# Patient Record
Sex: FEMALE | Race: WHITE
[De-identification: names, ages, dates, MRNs, and addresses within clinical notes are randomized per-mention and may not be internally consistent; named-entity substitution may affect disease eponyms.]

---

## 2018-05-02 ENCOUNTER — HOSPITAL ENCOUNTER (EMERGENCY)
Dept: HOSPITAL 97 - ER | Age: 58
Discharge: HOME | End: 2018-05-02
Payer: COMMERCIAL

## 2018-05-02 DIAGNOSIS — S52.502A: ICD-10-CM
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
## 2018-05-02 DIAGNOSIS — S82.025A: Primary | ICD-10-CM
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
## 2018-05-02 PROCEDURE — 99284 EMERGENCY DEPT VISIT MOD MDM: CPT

## 2018-05-02 PROCEDURE — 96372 THER/PROPH/DIAG INJ SC/IM: CPT

## 2018-05-02 NOTE — RAD REPORT
EXAM DESCRIPTION:  RAD - Knee Left 3 View - 5/2/2018 6:20 pm

 

CLINICAL HISTORY:   Left knee pain status post injury

 

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

 

A lipohemarthrosis is present indicative of a fracture.

 

Cortical irregularity involves patella which is suspicious for a fracture.

 

No dislocation is seen

## 2018-05-02 NOTE — ER
Nurse's Notes                                                                                     

 [facility]                                                                

Name: Mary Mcdonough                                                                                

Age: 57 yrs                                                                                       

Sex: Female                                                                                       

: 1960                                                                                   

MRN: [id number]                                                                                   

Arrival Date: 2018                                                                          

Time: 17:40                                                                                       

Account#: [id number]                                                                            

Bed 19                                                                                            

Private MD: out of town, doctor                                                                   

Diagnosis: Fall on same level from slipping, tripping and stumbling;Nondisplaced longitudinal     

  fracture of left patella;NONDISPLACED FRACTURE OF DISTAL RADIUS                                 

                                                                                                  

Presentation:                                                                                     

                                                                                             

17:42 Presenting complaint: Patient states: Reports trip and fall onto left knee 1 hour PTA,  aj  

      attempted to catch herself with left wrist. No LOC. Care prior to arrival: None.            

      Mechanism of Injury: Fall from standing position. Trauma event details: Injury occurred     

      in the [facility], Injury occurred: at home. Injury occurred: May 02, 2018          

      Injury occurred at: 14:53.                                                                  

17:42 Acuity: NATACHA 4                                                                             

17:42 Method Of Arrival: Wheelchair                                                             

17:45 Transition of care: patient was not received from another setting of care. Onset of     aj  

      symptoms was May 02, 2018. Initial Sepsis Screen: Does the patient meet any 2 criteria?     

      No. Patient's initial sepsis screen is negative. Does the patient have a suspected          

      source of infection? No. Patient's initial sepsis screen is negative.                       

                                                                                                  

Trauma Activation: Not Applicable                                                                 

 Physician: ED Physician; Name: ; Notified At: ; Arrived At:                                      

 Physician: General Surgeon; Name: ; Notified At: ; Arrived At:                                   

 Physician: Radiology; Name: ; Notified At: ; Arrived At:                                         

 Physician: Respiratory; Name: ; Notified At: ; Arrived At:                                       

 Physician: Lab; Name: ; Notified At: ; Arrived At:                                               

                                                                                                  

Historical:                                                                                       

- Allergies:                                                                                      

17:46 SHELLFISH;                                                                              aj  

- Home Meds:                                                                                      

17:46 Ibuprofen Oral [Active];                                                                aj  

- PMHx:                                                                                           

17:46 "Giant cell tumor";                                                                     aj  

- PSHx:                                                                                           

17:46 Tumor removal from left femur;                                                          aj  

                                                                                                  

- Immunization history: Last tetanus immunization: - up to date.                                  

- Social history:: Smoking status: Patient/guardian denies using tobacco.                         

                                                                                                  

                                                                                                  

Screenin:55 Abuse screen: Denies threats or abuse. Nutritional screening: No deficits noted.        em  

      Tuberculosis screening: No symptoms or risk factors identified. Fall Risk None              

      identified.                                                                                 

                                                                                                  

Primary Survey:                                                                                   

17:42 A: Airway: patent. Breathing/Chest: Respiratory pattern: regular, Respiratory effort:   aj  

      spontaneous, unlabored. Circulation: Skin color: pink, Skin temperature: warm, dry.         

      Disability Alert.                                                                           

                                                                                                  

Assessment:                                                                                       

17:42 General: Appears in no apparent distress. comfortable, Behavior is calm, cooperative,   aj  

      appropriate for age. Pain: Complains of pain in left wrist and left knee Pain currently     

      is 9 out of 10 on a pain scale. Neuro: Level of Consciousness is awake, alert, obeys        

      commands, Oriented to person, place, time, situation. Respiratory: Airway is patent         

      Respiratory effort is even, unlabored, Respiratory pattern is regular, symmetrical.         

      Derm: Skin is intact, is healthy with good turgor, Skin is pink, warm \T\ dry. normal.      

      Musculoskeletal: Reports pain in left wrist and left knee.                                  

19:12 Reassessment: Patient appears in no apparent distress at this time.                     iw  

19:48 Reassessment: Patient appears in no apparent distress at this time. Patient and/or      jd3 

      family updated on plan of care and expected duration. Pain level reassessed. Patient is     

      alert, oriented x 3, equal unlabored respirations, skin warm/dry/pink. pt reported          

      understanding of discharge instructions, assisted pt to front of ER with wheelchair.        

                                                                                                  

Vital Signs:                                                                                      

17:42  / 85; Pulse 92; Resp 20; Temp 98.8; Pulse Ox 98% on R/A; Weight 74.84 kg; Height aj  

      5 ft. 3 in. (160.02 cm); Pain 9/10;                                                         

19:39  / 68; Pulse 62; Resp 17 S; Pulse Ox 96% on R/A;                                  jd3 

17:42 Body Mass Index 29.23 (74.84 kg, 160.02 cm)                                             aj  

                                                                                                  

Freedom Coma Score:                                                                               

17:42 Eye Response: spontaneous(4). Verbal Response: oriented(5). Motor Response: obeys         

      commands(6). Total: 15.                                                                     

                                                                                                  

Trauma Score (Adult):                                                                             

17:42 Eye Response: spontaneous(1); Verbal Response: oriented(1); Motor Response: obeys         

      commands(2); Systolic BP: > 89 mm Hg(4); Respiratory Rate: 10 to 29 per min(4); Ryan     

      Score: 15; Trauma Score: 12                                                                 

                                                                                                  

ED Course:                                                                                        

17:40 Patient arrived in ED.                                                                  mr  

17:40 out of town, doctor is Private Physician.                                               mr  

17:43 Triage completed.                                                                       aj  

17:46 Arm band placed on right wrist. Patient placed in an exam room.                         aj  

17:49 [facility]                                                        snw 

17:49 Donald Hicks MD is Attending Physician.                                             snw 

17:57 Mohsen Eng LVN is Primary Nurse.                                                     em  

18:18 X-ray completed. Portable x-ray completed in exam room. Patient tolerated procedure     bb2 

      well.                                                                                       

18:19 Knee Left 3 View XRAY In Process Unspecified.                                           EDMS

18:19 Wrist Left (3 View) XRAY In Process Unspecified.                                        EDMS

19:22 Patient has correct armband on for positive identification. Bed in low position. Call   em  

      light in reach. Adult w/ patient.                                                           

19:22 No provider procedures requiring assistance completed. Patient did not have IV access   em  

      during this emergency room visit.                                                           

19:36 Orthoglass splint: Thumb spica splint applied on left forearm. Knee immobilizer applied cb2 

      on left knee. Sling applied to left arm.                                                    

19:45 Primary Nurse role handed off by Mohsen Eng LVN                                      rg2 

19:48 Vince Zaman RN is Primary Nurse.                                                  jd3 

                                                                                                  

Administered Medications:                                                                         

18:42 Not Given (Physician Discretion): Valium 2 mg PO once                                   em  

18:43 Drug: Valium 2.5 mg Route: PO;                                                          em  

18:58 Follow up: Response: No adverse reaction                                                em  

19:06 Drug: morphine 8 mg Route: IM; Site: right deltoid;                                     jd3 

19:50 Follow up: Response: No adverse reaction; Pain is decreased                             jd3 

                                                                                                  

                                                                                                  

Outcome:                                                                                          

19:06 Discharge ordered by MD.                                                                snw 

19:48 Discharged to home via wheelchair, with family.                                         jd3 

19:48 Condition: stable                                                                           

19:48 Discharge instructions given to patient, Instructed on discharge instructions, follow       

      up and referral plans. medication usage, Demonstrated understanding of instructions,        

      follow-up care, medications, Prescriptions given X 1.                                       

19:51 Patient left the ED.                                                                    jd3 

                                                                                                  

Signatures:                                                                                       

Dispatcher [employer]                           Brian Dover                               rg2                                                  

Arabella Bruno RN RN aj Therrien, Shelly, FNP-C                 FNP-Csnw                                                  

AlbertsBekah                                mr                                                   

Mohsen Eng LVN LVN  em                                                   

Angelina Looney RN RN                                                      

Franc Wooten                             [facility]                                                  

Vince Zaman RN RN jd3                                                  

Aleena Rogers                               bb2                                                  

                                                                                                  

Corrections: (The following items were deleted from the chart)                                    

17:44 17:42 Immunization history Last tetanus immunization: - up to date. lana schwartz  

                                                                                                  

**************************************************************************************************

## 2018-05-02 NOTE — EDPHYS
Physician Documentation                                                                           

 [facility]                                                                

Name: Mary Mcdonough                                                                                

Age: 57 yrs                                                                                       

Sex: Female                                                                                       

: 1960                                                                                   

MRN: [id number]                                                                                   

Arrival Date: 2018                                                                          

Time: 17:40                                                                                       

Account#: [id number]                                                                            

Bed 19                                                                                            

Private MD: out of town, doctor                                                                   

ED Physician Donald Hicks                                                                      

HPI:                                                                                              

                                                                                             

18:04 This 57 yrs old  Female presents to ER via Wheelchair with complaints of Fall  snw 

      Injury.                                                                                     

18:04 Details of fall: The patient fell from an upright position, while walking. Onset: The   snw 

      symptoms/episode began/occurred suddenly, just prior to arrival. Associated injuries:       

      The patient sustained left knee, left wrist, abrasion, contusion, decreased range of        

      motion, painful injury, swelling. Severity of symptoms: At their worst the symptoms         

      were moderate. It is unknown whether or not the patient has had similar symptoms in the     

      past. from [place]. 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     

      pieces remain. right hand dominant.                                                         

                                                                                                  

Historical:                                                                                       

- Allergies:                                                                                      

17:46 SHELLFISH;                                                                              aj  

- Home Meds:                                                                                      

17:46 Ibuprofen Oral [Active];                                                                aj  

- PMHx:                                                                                           

17:46 "Giant cell tumor";                                                                     aj  

- PSHx:                                                                                           

17:46 Tumor removal from left femur;                                                          aj  

                                                                                                  

- Immunization history: Last tetanus immunization: - up to date.                                  

- Social history:: Smoking status: Patient/guardian denies using tobacco.                         

                                                                                                  

                                                                                                  

ROS:                                                                                              

18:02 Eyes: Negative for injury, pain, redness, and discharge, ENT: Negative for injury,      snw 

      pain, and discharge, Neck: Negative for injury, pain, and swelling, Cardiovascular:         

      Negative for chest pain, palpitations, and edema, Respiratory: Negative for shortness       

      of breath, cough, wheezing, and pleuritic chest pain, Abdomen/GI: Negative for              

      abdominal pain, nausea, vomiting, diarrhea, and constipation, Back: Negative for injury     

      and pain, : Negative for injury, bleeding, discharge, and swelling, Neuro: Negative       

      for headache, weakness, numbness, tingling, and seizure.                                    

18:02 Constitutional: Positive for body aches, malaise.                                           

18:02 MS/extremity: Positive for injury or acute deformity, swelling, tenderness, of the left     

      knee and left wrist.                                                                        

                                                                                                  

Exam:                                                                                             

18:02 Constitutional:  This is a well developed, well nourished patient who is awake, alert,  snw 

      and in mild cute distress. Head/Face:  Normocephalic, atraumatic. Eyes:  Pupils equal       

      round and reactive to light, extra-ocular motions intact.  Lids and lashes normal.          

      Conjunctiva and sclera are non-icteric and not injected.  Cornea within normal limits.      

      Periorbital areas with no swelling, redness, or edema. ENT:  Nares patent. No nasal         

      discharge, no septal abnormalities noted.  Tympanic membranes are normal and external       

      auditory canals are clear.  Oropharynx with no redness, swelling, or masses, exudates,      

      or evidence of obstruction, uvula midline.  Mucous membranes moist. Neck:  Trachea          

      midline, no thyromegaly or masses palpated, and no cervical lymphadenopathy.  Supple,       

      full range of motion without nuchal rigidity, or vertebral point tenderness.  No            

      Meningismus. Chest/axilla:  Normal chest wall appearance and motion.  Nontender with no     

      deformity.  No lesions are appreciated. Cardiovascular:  Regular rate and rhythm with a     

      normal S1 and S2.  No gallops, murmurs, or rubs.  Normal PMI, no JVD.  No pulse             

      deficits. Respiratory:  Lungs have equal breath sounds bilaterally, clear to                

      auscultation and percussion.  No rales, rhonchi or wheezes noted.  No increased work of     

      breathing, no retractions or nasal flaring. Abdomen/GI:  Soft, non-tender, with normal      

      bowel sounds.  No distension or tympany.  No guarding or rebound.  No evidence of           

      tenderness throughout. Back:  No spinal tenderness.  No costovertebral tenderness.          

      Full range of motion. Neuro:  Awake and alert, GCS 15, oriented to person, place, time,     

      and situation.  Cranial nerves II-XII grossly intact.  Motor strength 5/5 in all            

      extremities.  Sensory grossly intact.  Cerebellar exam normal.  Normal gait. Psych:         

      Awake, alert, with orientation to person, place and time.  Behavior, mood, and affect       

      are within normal limits.                                                                   

18:02 Skin: injury, abrasion(s), small abrasion noted, of the left ankle, left knee,              

      contusion(s), that are deep, of the left wrist - probable fx of metacarpals, edema,         

      tenderness, mild deformity.                                                                 

                                                                                                  

Vital Signs:                                                                                      

17:42  / 85; Pulse 92; Resp 20; Temp 98.8; Pulse Ox 98% on R/A; Weight 74.84 kg; Height aj  

      5 ft. 3 in. (160.02 cm); Pain 9/10;                                                         

19:39  / 68; Pulse 62; Resp 17 S; Pulse Ox 96% on R/A;                                  jd3 

17:42 Body Mass Index 29.23 (74.84 kg, 160.02 cm)                                             aj  

                                                                                                  

Ryan Coma Score:                                                                               

17:42 Eye Response: spontaneous(4). Verbal Response: oriented(5). Motor Response: obeys       aj  

      commands(6). Total: 15.                                                                     

                                                                                                  

Trauma Score (Adult):                                                                             

17:42 Eye Response: spontaneous(1); Verbal Response: oriented(1); Motor Response: obeys       aj  

      commands(2); Systolic BP: > 89 mm Hg(4); Respiratory Rate: 10 to 29 per min(4); Ryan     

      Score: 15; Trauma Score: 12                                                                 

                                                                                                  

MDM:                                                                                              

17:50 Patient medically screened.                                                             snw 

19:29 Data reviewed: vital signs, nurses notes. Data interpreted: Pulse oximetry: on room air snw 

      is 98 %. Interpretation: normal. Counseling: I had a detailed discussion with the           

      patient and/or guardian regarding: the historical points, exam findings, and any            

      diagnostic results supporting the discharge/admit diagnosis, the presence of at least       

      one elevated blood pressure reading (>120/80) during this emergency department visit,       

      radiology results, the need for outpatient follow up, to return to the emergency            

      department if symptoms worsen or persist or if there are any questions or concerns that     

      arise at home. Special discussion: Based on the history and exam findings, there is no      

      indication for further emergent testing or inpatient evaluation. I discussed with the       

      patient/guardian the need to see the orthopedic surgeon for further evaluation of the       

      symptoms. I discussed with the patient/guardian the need to see the primary care            

      provider for further evaluation of the symptoms.                                            

                                                                                                  

                                                                                             

18:00 Order name: Knee Left 3 View XRAY; Complete Time: 18:44                                 snw 

                                                                                             

18:00 Order name: Wrist Left (3 View) XRAY; Complete Time: 19:27                              snw 

                                                                                             

18:00 Order name: Ice pack: x 2; Complete Time: 18:42                                         snw 

                                                                                             

18:00 Order name: Knee Immobilizer; Complete Time: 18:42                                      snw 

                                                                                             

18:00 Order name: Sugar Tong Forearm Splint; Complete Time: 18:42                             snw 

                                                                                             

18:00 Order name: Sling; Complete Time: 19:36                                                 snw 

                                                                                                  

Administered Medications:                                                                         

18:42 Not Given (Physician Discretion): Valium 2 mg PO once                                   em  

18:43 Drug: Valium 2.5 mg Route: PO;                                                          em  

18:58 Follow up: Response: No adverse reaction                                                em  

19:06 Drug: morphine 8 mg Route: IM; Site: right deltoid;                                     jd3 

19:50 Follow up: Response: No adverse reaction; Pain is decreased                             jd3 

                                                                                                  

                                                                                                  

Disposition:                                                                                      

18 19:06 Discharged to Home. Impression: Fall on same level from slipping, tripping and     

  stumbling, Nondisplaced longitudinal fracture of left patella, NONDISPLACED                     

  FRACTURE OF DISTAL RADIUS.                                                                      

- Condition is Stable.                                                                            

- Discharge Instructions: Cast or Splint Care, Fall Prevention and Home Safety, Knee              

  Immobilizer, Patellar Fracture, Adult, Radial Fracture.                                         

- Prescriptions for Tylenol- Codeine #3 300-30 mg Oral Tablet - take 2 tablet by ORAL             

  route every 6 hours As needed; 30 tablet.                                                       

- Medication Reconciliation Form, Thank You Letter, Antibiotic Education, Prescription            

  Opioid Use form.                                                                                

- Follow up: Private Physician; When: 2 - 3 days; Reason: Recheck today's complaints,             

  Continuance of care, Re-evaluation by your physician. Follow up: Emergency                      

  Department; When: As needed; Reason: Worsening of condition.                                    

                                                                                                  

                                                                                                  

                                                                                                  

Addendum:                                                                                         

2018                                                                                        

     07:04 Co-signature as Attending Physician, Donald Hicks MD I agree with the assessment and  w
a

           plan of care.                                                                          

                                                                                                  

Signatures:                                                                                       

Dispatcher [employer]                           Arabella Lu RN                       Taniya Jay, FNP-C                 FNP-Csnw                                                  

Mohsen Eng, LVN                       LVN                                                     

Donald Hicks MD MD wa Davies, Jonathon RN                    RN   jd3                                                  

                                                                                                  

Corrections: (The following items were deleted from the chart)                                    

                                                                                             

17:44 17:42 Immunization history Last tetanus immunization: - up to date. lana schwartz  

19:51 19:06 2018 19:06 Discharged to Home. Impression: Fall on same level from          jd3 

      slipping, tripping and stumbling; Nondisplaced longitudinal fracture of left patella;       

      NONDISPLACED FRACTURE OF DISTAL RADIUS. Condition is Stable. Forms are Medication           

      Reconciliation Form, Thank You Letter, Antibiotic Education, Prescription Opioid Use.       

      Follow up: Private Physician; When: 2 - 3 days; Reason: Recheck today's complaints,         

      Continuance of care, Re-evaluation by your physician. Follow up: Emergency Department;      

      When: As needed; Reason: Worsening of condition. snw                                        

                                                                                                  

**************************************************************************************************

## 2018-05-02 NOTE — RAD REPORT
EXAM DESCRIPTION:  RAD - Wrist Left 3 View - 5/2/2018 6:20 pm

 

CLINICAL HISTORY:   Left wrist pain status post injury

 

FINDINGS:  No  fracture or dislocation is seen.

 

If the patient continues to have symptoms to suggest an occult fracture then a followup plain film se
max in 7 days would be recommended

## 2023-08-04 ENCOUNTER — APPOINTMENT (OUTPATIENT)
Dept: URBAN - METROPOLITAN AREA SURGERY 9 | Age: 63
Setting detail: DERMATOLOGY
End: 2023-08-04

## 2023-08-04 DIAGNOSIS — L82.1 OTHER SEBORRHEIC KERATOSIS: ICD-10-CM

## 2023-08-04 DIAGNOSIS — B07.8 OTHER VIRAL WARTS: ICD-10-CM

## 2023-08-04 DIAGNOSIS — D22 MELANOCYTIC NEVI: ICD-10-CM

## 2023-08-04 DIAGNOSIS — L81.4 OTHER MELANIN HYPERPIGMENTATION: ICD-10-CM

## 2023-08-04 DIAGNOSIS — L57.0 ACTINIC KERATOSIS: ICD-10-CM

## 2023-08-04 PROBLEM — D22.5 MELANOCYTIC NEVI OF TRUNK: Status: ACTIVE | Noted: 2023-08-04

## 2023-08-04 PROCEDURE — OTHER SUNSCREEN RECOMMENDATIONS: OTHER

## 2023-08-04 PROCEDURE — 99203 OFFICE O/P NEW LOW 30 MIN: CPT | Mod: 25

## 2023-08-04 PROCEDURE — 17000 DESTRUCT PREMALG LESION: CPT | Mod: 59

## 2023-08-04 PROCEDURE — OTHER REASSURANCE: OTHER

## 2023-08-04 PROCEDURE — OTHER MIPS QUALITY: OTHER

## 2023-08-04 PROCEDURE — OTHER TREATMENT REGIMEN: OTHER

## 2023-08-04 PROCEDURE — 17110 DESTRUCT B9 LESION 1-14: CPT

## 2023-08-04 PROCEDURE — OTHER COUNSELING: OTHER

## 2023-08-04 PROCEDURE — OTHER LIQUID NITROGEN: OTHER

## 2023-08-04 ASSESSMENT — LOCATION DETAILED DESCRIPTION DERM
LOCATION DETAILED: EPIGASTRIC SKIN
LOCATION DETAILED: SUPERIOR LUMBAR SPINE
LOCATION DETAILED: LEFT DORSAL INDEX METACARPOPHALANGEAL JOINT
LOCATION DETAILED: LEFT FOREHEAD
LOCATION DETAILED: LEFT PROXIMAL DORSAL FOREARM
LOCATION DETAILED: RIGHT MEDIAL UPPER BACK
LOCATION DETAILED: RIGHT PROXIMAL DORSAL FOREARM

## 2023-08-04 ASSESSMENT — LOCATION SIMPLE DESCRIPTION DERM
LOCATION SIMPLE: LEFT FOREHEAD
LOCATION SIMPLE: LEFT FOREARM
LOCATION SIMPLE: LEFT HAND
LOCATION SIMPLE: ABDOMEN
LOCATION SIMPLE: RIGHT FOREARM
LOCATION SIMPLE: LOWER BACK
LOCATION SIMPLE: RIGHT UPPER BACK

## 2023-08-04 ASSESSMENT — LOCATION ZONE DERM
LOCATION ZONE: TRUNK
LOCATION ZONE: FACE
LOCATION ZONE: HAND
LOCATION ZONE: ARM

## 2023-08-04 NOTE — PROCEDURE: LIQUID NITROGEN
Show Topical Anesthesia Variable?: Yes
Medical Necessity Information: It is in your best interest to select a reason for this procedure from the list below. All of these items fulfill various CMS LCD requirements except the new and changing color options.
Consent: The patient's consent was obtained including but not limited to risks of pain, crusting, blistering, scarring.
Post-Care Instructions: Pt may apply Vaseline to crusted or scabbing areas.
Detail Level: Simple
Spray Paint Text: The liquid nitrogen was applied to the skin utilizing a spray paint frosting technique.
Render Note In Bullet Format When Appropriate: No
Number Of Freeze-Thaw Cycles: 2 freeze-thaw cycles
Medical Necessity Clause: This procedure was medically necessary because the lesions that were treated were:
Number Of Freeze-Thaw Cycles: 1 freeze-thaw cycle
Duration Of Freeze Thaw-Cycle (Seconds): 5
Consent: The patient's consent was obtained including but not limited to risks of crusting, blistering, scarring, pigmentary change.

## 2023-08-04 NOTE — HPI: SKIN LESION
What Type Of Note Output Would You Prefer (Optional)?: Standard Output
Has Your Skin Lesion Been Treated?: not been treated
Is This A New Presentation, Or A Follow-Up?: Skin Lesion
Additional History: Pt reports spot started as a blister.

## 2024-08-07 ENCOUNTER — APPOINTMENT (OUTPATIENT)
Dept: URBAN - METROPOLITAN AREA SURGERY 9 | Age: 64
Setting detail: DERMATOLOGY
End: 2024-08-07

## 2024-08-07 DIAGNOSIS — L82.1 OTHER SEBORRHEIC KERATOSIS: ICD-10-CM

## 2024-08-07 DIAGNOSIS — D22 MELANOCYTIC NEVI: ICD-10-CM

## 2024-08-07 DIAGNOSIS — L82.0 INFLAMED SEBORRHEIC KERATOSIS: ICD-10-CM

## 2024-08-07 DIAGNOSIS — L81.4 OTHER MELANIN HYPERPIGMENTATION: ICD-10-CM

## 2024-08-07 DIAGNOSIS — B07.8 OTHER VIRAL WARTS: ICD-10-CM

## 2024-08-07 PROBLEM — D22.5 MELANOCYTIC NEVI OF TRUNK: Status: ACTIVE | Noted: 2024-08-07

## 2024-08-07 PROCEDURE — OTHER COUNSELING: OTHER

## 2024-08-07 PROCEDURE — OTHER TREATMENT REGIMEN: OTHER

## 2024-08-07 PROCEDURE — OTHER MIPS QUALITY: OTHER

## 2024-08-07 PROCEDURE — 99213 OFFICE O/P EST LOW 20 MIN: CPT | Mod: 25

## 2024-08-07 PROCEDURE — OTHER SUNSCREEN RECOMMENDATIONS: OTHER

## 2024-08-07 PROCEDURE — 17110 DESTRUCT B9 LESION 1-14: CPT

## 2024-08-07 PROCEDURE — OTHER DEFER: OTHER

## 2024-08-07 PROCEDURE — OTHER LIQUID NITROGEN: OTHER

## 2024-08-07 PROCEDURE — OTHER REASSURANCE: OTHER

## 2024-08-07 ASSESSMENT — LOCATION DETAILED DESCRIPTION DERM
LOCATION DETAILED: RIGHT PROXIMAL DORSAL FOREARM
LOCATION DETAILED: EPIGASTRIC SKIN
LOCATION DETAILED: RIGHT MEDIAL UPPER BACK
LOCATION DETAILED: LEFT PROXIMAL DORSAL FOREARM
LOCATION DETAILED: RIGHT ANTERIOR SHOULDER
LOCATION DETAILED: LEFT DORSAL INDEX METACARPOPHALANGEAL JOINT
LOCATION DETAILED: SUPERIOR LUMBAR SPINE

## 2024-08-07 ASSESSMENT — LOCATION SIMPLE DESCRIPTION DERM
LOCATION SIMPLE: RIGHT UPPER BACK
LOCATION SIMPLE: LOWER BACK
LOCATION SIMPLE: ABDOMEN
LOCATION SIMPLE: LEFT HAND
LOCATION SIMPLE: RIGHT FOREARM
LOCATION SIMPLE: LEFT FOREARM
LOCATION SIMPLE: RIGHT SHOULDER

## 2024-08-07 ASSESSMENT — LOCATION ZONE DERM
LOCATION ZONE: HAND
LOCATION ZONE: TRUNK
LOCATION ZONE: ARM

## 2024-08-07 NOTE — PROCEDURE: LIQUID NITROGEN
Show Topical Anesthesia Variable?: Yes
Medical Necessity Information: It is in your best interest to select a reason for this procedure from the list below. All of these items fulfill various CMS LCD requirements except the new and changing color options.
Consent: The patient's consent was obtained including but not limited to risks of pain, crusting, blistering, scarring.
Post-Care Instructions: Pt may apply Vaseline to crusted or scabbing areas.
Detail Level: Simple
Spray Paint Text: The liquid nitrogen was applied to the skin utilizing a spray paint frosting technique.
Render Note In Bullet Format When Appropriate: No
Number Of Freeze-Thaw Cycles: 2 freeze-thaw cycles
Medical Necessity Clause: This procedure was medically necessary because the lesions that were treated were:

## 2024-08-07 NOTE — PROCEDURE: DEFER
Detail Level: Detailed
Reason To Defer Override: DESIREE catches on her clothing and becomes irritated. Discussed removal, pt declines for today. She will notify us if she would like it removed.
Size Of Lesion In Cm (Optional): 0
Introduction Text (Please End With A Colon): The following procedure was deferred: will schedule with MD

## 2024-08-07 NOTE — HPI: EVALUATION OF SKIN LESION(S)
Hpi Title: Evaluation of Skin Lesions
Additional History: Pt has asymptomatic lesions on back and one on shoulder.